# Patient Record
(demographics unavailable — no encounter records)

---

## 2020-05-28 PROBLEM — Z00.129 WELL CHILD VISIT: Status: ACTIVE | Noted: 2020-05-28

## 2020-06-02 VITALS
WEIGHT: 72 LBS | DIASTOLIC BLOOD PRESSURE: 72 MMHG | HEIGHT: 51.97 IN | SYSTOLIC BLOOD PRESSURE: 109 MMHG | BODY MASS INDEX: 18.74 KG/M2 | RESPIRATION RATE: 83 BRPM

## 2020-06-02 NOTE — REVIEW OF SYSTEMS
[Nl] : Hematologic/Lymphatic [NI] : Endocrine [Immunizations are up to date] : Immunizations are up to date [Smokers in Home] : no one in home smokes [FreeTextEntry1] : records maintained by [facility]

## 2020-06-02 NOTE — PHYSICAL EXAM
[Normal] : normal [FreeTextEntry1] : Telehealth Visit [de-identified] : telehealth video exam - limited exam - within normal limits [FreeTextEntry3] : telehealth video exam - limited exam - within normal limits [FreeTextEntry2] : telehealth video exam - limited exam - within normal limits [de-identified] : telehealth video exam - limited exam - within normal limits; all joints with full ROM

## 2020-06-02 NOTE — CONSULT LETTER
[Please see my note below.] : Please see my note below. [Consult Letter:] : I had the pleasure of evaluating your patient, [unfilled]. [Sincerely,] : Sincerely, [Consult Closing:] : Thank you very much for allowing me to participate in the care of this patient.  If you have any questions, please do not hesitate to contact me. [Dear  ___] : Dear  [unfilled], [FreeTextEntry2] : Stanley Madrigal MD\par [address] [place] [address]\[place] [address]       	Ph: [phone],  Fax [phone] [FreeTextEntry3] : Ingrid Del Toro MD, MS\par Chief, Pediatric Rheumatology\par [facility]

## 2020-06-02 NOTE — HISTORY OF PRESENT ILLNESS
[Other Location: e.g. School (Enter Location, City,State)___] : at [unfilled], at the time of the visit. [Other Location: e.g. Home (Enter Location, City,State)___] : at [unfilled] [Mother] : mother [FreeTextEntry3] : Ivana Rico - mother [FreeTextEntry4] : Alayna Mcadams [FreeTextEntry1] : Betzy is a 10 year old girl who had pain in her hands over the past year which worsened over the past 4 months.  THe pain is worse in the afternoon.  She denies am stiffness.  Mom does not see any joint swelling.  Her wrist also hurts and all the fingers.  She also has pain in her knees and ankles.  Mom says she has flat feet and it might be from that.  \par \par She denies any functional difficulties.\par \par No labs or x-rays

## 2020-07-06 PROBLEM — M21.41 PES PLANUS OF BOTH FEET: Status: ACTIVE | Noted: 2020-06-02

## 2020-07-06 PROBLEM — M25.549 PAIN IN MULTIPLE FINGER JOINTS: Status: ACTIVE | Noted: 2020-06-02

## 2020-07-06 PROBLEM — M25.531 RIGHT WRIST PAIN: Status: ACTIVE | Noted: 2020-06-02

## 2020-07-06 NOTE — SOCIAL HISTORY
[Mother] : mother [Father] : father [___ Brothers] : [unfilled] brothers [Grade:  _____] : Grade: [unfilled] [___ Sisters] : [unfilled] sisters

## 2020-07-07 VITALS
SYSTOLIC BLOOD PRESSURE: 98 MMHG | HEIGHT: 52.91 IN | HEART RATE: 80 BPM | WEIGHT: 73.41 LBS | BODY MASS INDEX: 18.55 KG/M2 | DIASTOLIC BLOOD PRESSURE: 53 MMHG

## 2020-07-07 DIAGNOSIS — M25.549 PAIN IN JOINTS OF UNSPECIFIED HAND: ICD-10-CM

## 2020-07-07 DIAGNOSIS — M21.42 FLAT FOOT [PES PLANUS] (ACQUIRED), RIGHT FOOT: ICD-10-CM

## 2020-07-07 DIAGNOSIS — M21.41 FLAT FOOT [PES PLANUS] (ACQUIRED), RIGHT FOOT: ICD-10-CM

## 2020-07-07 DIAGNOSIS — M25.531 PAIN IN RIGHT WRIST: ICD-10-CM

## 2020-07-17 NOTE — CONSULT LETTER
[Dear  ___] : Dear  [unfilled], [Please see my note below.] : Please see my note below. [Sincerely,] : Sincerely, [Consult Closing:] : Thank you very much for allowing me to participate in the care of this patient.  If you have any questions, please do not hesitate to contact me. [FreeTextEntry2] : Stanley Madrigal MD\par [address] [place] [address]\[place] [address]       	Ph: [phone],  Fax [phone] [Courtesy Letter:] : I had the pleasure of seeing your patient, [unfilled], in my office today. [FreeTextEntry3] : Ingrid Del Toro MD, MS\par Chief, Pediatric Rheumatology\par [facility]

## 2020-07-17 NOTE — REVIEW OF SYSTEMS
[NI] : Endocrine [Nl] : Hematologic/Lymphatic [Immunizations are up to date] : Immunizations are up to date [Smokers in Home] : no one in home smokes [FreeTextEntry1] : records maintained by [facility]

## 2020-07-17 NOTE — HISTORY OF PRESENT ILLNESS
[FreeTextEntry1] : Betzy had a telehealth visit last month for hand pain.  I diagnosed pes planus and benign joint pains.   SHe is here for a follow-up in-person visit.  \par \par Since that visit she has not complained of pain.  No difficulty with any activity.

## 2020-07-17 NOTE — REASON FOR VISIT
[Follow-Up: _____] : a [unfilled] follow-up visit [Patient] : patient [Mother] : mother [FreeTextEntry1] : joint pains

## 2025-07-22 NOTE — REASON FOR VISIT
The patient has had an E-Consult completed. Please refer to that note as needed. Please communicate the information below to the patient. Based on the recommendations of the specialist, I recommend that the patient do the following:    Discussed on phone. This is to close consult request.    [Consultation] : a consultation visit [Patient] : patient [Mother] : mother [FreeTextEntry1] : for hand pain